# Patient Record
Sex: MALE | Race: BLACK OR AFRICAN AMERICAN
[De-identification: names, ages, dates, MRNs, and addresses within clinical notes are randomized per-mention and may not be internally consistent; named-entity substitution may affect disease eponyms.]

---

## 2018-07-08 ENCOUNTER — HOSPITAL ENCOUNTER (EMERGENCY)
Dept: HOSPITAL 97 - ER | Age: 23
Discharge: HOME | End: 2018-07-08
Payer: SELF-PAY

## 2018-07-08 DIAGNOSIS — F17.210: ICD-10-CM
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
## 2018-07-08 DIAGNOSIS — Z88.0: ICD-10-CM
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
## 2018-07-08 DIAGNOSIS — S39.012A: Primary | ICD-10-CM

## 2018-07-08 PROCEDURE — 99282 EMERGENCY DEPT VISIT SF MDM: CPT

## 2018-07-08 PROCEDURE — 81003 URINALYSIS AUTO W/O SCOPE: CPT

## 2018-07-08 NOTE — EDPHYS
Physician Documentation                                                                           

 [facility]                                                                

Name: Mark Wolff                                                                              

Age: 22 yrs                                                                                       

Sex: Male                                                                                         

: 1995                                                                                   

MRN: [id number]                                                                                   

Arrival Date: 2018                                                                          

Time: 19:30                                                                                       

Account#: [id number]                                                                            

Bed 18                                                                                            

Private MD:                                                                                       

ED Physician Fernandez López                                                                      

HPI:                                                                                              

                                                                                             

19:49 This 22 yrs old Black Male presents to ER via Ambulatory with complaints of Back Pain.  jmm 

19:49 The patient presents with pain that is acute. The symptoms are located in the lumbar    jmm 

      spine. Onset: The symptoms/episode began/occurred gradually, 1 week(s) ago. The pain        

      does not radiate. Associated signs and symptoms: Pertinent negatives: abdominal pain,       

      dysuria, fever, hematuria, incontinence, numbness, tingling, urinary retention,             

      vomiting. This is a 22 year old male with no chronic medical conditions that presents       

      to the ED with right lower back pain beginning approx 1 week ago. Patient denies fever,     

      abdominal pain, vomiting, shortness of breath, numbness, radiation of pain. The patient     

      states he is a  but denies a point at which he may have injured himself. .        

                                                                                                  

Historical:                                                                                       

- Allergies:                                                                                      

19:45 PENICILLINS;                                                                            fc  

- Home Meds:                                                                                      

19:45 None [Active];                                                                          fc  

- PMHx:                                                                                           

19:45 None;                                                                                   fc  

- PSHx:                                                                                           

19:45 None;                                                                                   fc  

                                                                                                  

- Immunization history:: Last tetanus immunization: up to date.                                   

- Social history:: Smoking status: Patient uses tobacco products, smokes one-half pack            

  cigarettes per day, Patient/guardian denies using alcohol, street drugs.                        

- Ebola Screening: : Patient negative for fever greater than or equal to 101.5 degrees            

  Fahrenheit, and additional compatible Ebola Virus Disease symptoms Patient denies               

  exposure to infectious person Patient denies travel to an Ebola-affected area in the            

  21 days before illness onset.                                                                   

                                                                                                  

                                                                                                  

ROS:                                                                                              

19:49 Constitutional: Negative for fever, chills, and weight loss, Cardiovascular: Negative   [facility] 

      for chest pain, palpitations, and edema, Respiratory: Negative for shortness of breath,     

      cough, wheezing, and pleuritic chest pain, Abdomen/GI: Negative for abdominal pain,         

      nausea, vomiting, diarrhea, and constipation.                                               

19:49 MS/Extremity: Negative for injury and deformity, Skin: Negative for injury, rash, and       

      discoloration, Neuro: Negative for headache, weakness, numbness, tingling, and seizure.     

19:49 Back: Positive for pain with movement.                                                      

19:49 All other systems are negative.                                                             

                                                                                                  

Exam:                                                                                             

19:49 Constitutional:  This is a well developed, well nourished patient who is awake, alert,  jmm 

      and in no acute distress. Head/Face:  atraumatic. Eyes:  EOMI, no conjunctival erythema     

      appreciated ENT:  Moist Mucus Membranes Neck:  Trachea midline, Supple Chest/axilla:        

      Normal chest wall appearance and motion.   Cardiovascular:  Regular rate and rhythm.        

      No edema appreciated Respiratory:  Normal respirations, no respiratory distress             

      appreciated Abdomen/GI:  Non distended, soft                                                

19:49 MS/ Extremity:  Moves all extremities, no obvious deformities appreciated, no edema         

      noted to the lower extremities  Neuro:  Awake and alert, normal gait                        

19:49 Back: pain, that is mild, of the  right low back, ROM is normal, painful, CVA               

      tenderness, is absent, muscle spasm, is not present.                                        

19:49 Neuro: Orientation: is normal, Mentation: is normal, Memory: is normal, extension of        

      the great toes noted bilaterally.                                                           

19:49 Psych: Behavior/mood is pleasant, cooperative.                                              

                                                                                                  

Vital Signs:                                                                                      

19:45  / 75; Pulse 65; Resp 18; Pulse Ox 100% on R/A; Weight 69.85 kg (R); Height 5 ft. fc  

      10 in. (177.80 cm) (R); Pain 7/10;                                                          

20:56  / 68; Pulse 80; Resp 17 S; Temp 98.5(O); Pulse Ox 100% on R/A;                   jd3 

19:45 Body Mass Index 22.10 (69.85 kg, 177.80 cm)                                             fc  

                                                                                                  

MDM:                                                                                              

19:48 Patient medically screened.                                                             [facility] 

20:16 Data reviewed: vital signs, nurses notes, lab test result(s). Counseling: I had a       [facility] 

      detailed discussion with the patient and/or guardian regarding: the historical points,      

      exam findings, and any diagnostic results supporting the discharge/admit diagnosis, the     

      need for outpatient follow up, to return to the emergency department if symptoms worsen     

      or persist or if there are any questions or concerns that arise at home. ED course:         

      Urine dip negative for blood. Symptoms appear consistent with back strain. patient will     

      be put on a course of muscle relaxers and anti inflammatories. Patient advised to           

      return to the ED if he develops abdominal pain, vomiting, fever, or worsening pain. .       

20:23 Counseling: I had a detailed discussion with the patient and/or guardian regarding: the [facility] 

      presence of at least one elevated blood pressure reading (>120/80) during this              

      emergency department visit.                                                                 

                                                                                                  

                                                                                             

20:11 Order name: Urine Dipstick--Ancillary (enter results)                                   rg2 

                                                                                             

20:11 Order name: Urine Dipstick-Ancillary; Complete Time: 20:23                              [facility]

                                                                                             

19:48 Order name: Urine Dipstick-Ancillary (obtain specimen); Complete Time: 20:10            [facility] 

                                                                                                  

Administered Medications:                                                                         

No medications were administered                                                                  

                                                                                                  

                                                                                                  

Disposition:                                                                                      

                                                                                             

09:29 Co-signature as Attending Physician, Fernandez López MD I agree with the assessment and  pamela 

      plan of care.                                                                               

                                                                                                  

Disposition:                                                                                      

18 20:18 Discharged to Home. Impression: Strain of muscle, fascia and tendon of lower       

  back.                                                                                           

- Condition is Stable.                                                                            

- Discharge Instructions: Back Pain, Adult.                                                       

- Prescriptions for Ibuprofen 800 mg Oral Tablet - take 1 tablet by ORAL route every 12           

  hours As needed take with food; 20 tablet. orphenadrine citrate 100 mg Oral Tablet              

  Sustained Release - take 1 tablet by ORAL route 2 times per day As needed; 20 tablet.           

- Work release form, Medication Reconciliation Form, Thank You Letter, Antibiotic                 

  Education, Prescription Opioid Use form.                                                        

- Follow up: Private Physician; When: 2 - 3 days; Reason: Continuance of care.                    

                                                                                                  

                                                                                                  

                                                                                                  

Signatures:                                                                                       

Dispatcher [employer]                           Fernandez England MD MD cha Mickail, Joel, PA PA jmm Chretien, Felicia, RN RN fc Davies, Jonathon, RN                    RN   jd3                                                  

                                                                                                  

Corrections: (The following items were deleted from the chart)                                    

                                                                                             

21:00 20:18 2018 20:18 Discharged to Home. Impression: Strain of muscle, fascia and     jd3 

      tendon of lower back. Condition is Stable. Forms are Medication Reconciliation Form,        

      Thank You Letter, Antibiotic Education, Prescription Opioid Use. Follow up: Private         

      Physician; When: 2 - 3 days; Reason: Continuance of care. [email or web address]                               

                                                                                                  

**************************************************************************************************

## 2018-07-08 NOTE — ER
Nurse's Notes                                                                                     

 [facility]                                                                

Name: Mark Wolff                                                                              

Age: 22 yrs                                                                                       

Sex: Male                                                                                         

: 1995                                                                                   

MRN: [id number]                                                                                   

Arrival Date: 2018                                                                          

Time: 19:30                                                                                       

Account#: [id number]                                                                            

Bed 18                                                                                            

Private MD:                                                                                       

Diagnosis: Strain of muscle, fascia and tendon of lower back                                      

                                                                                                  

Presentation:                                                                                     

                                                                                             

19:44 Presenting complaint: Patient states: that he is having lower back back that started 1  fc  

      week ago. Denies any injury, urinary problems or bowel problems. Transition of care:        

      patient was not received from another setting of care. Onset of symptoms was 2018. Risk Assessment: Do you want to hurt yourself or someone else? Patient reports no     

      desire to harm self or others. Initial Sepsis Screen: Does the patient meet any 2           

      criteria? No. Patient's initial sepsis screen is negative. Does the patient have a          

      suspected source of infection? No. Patient's initial sepsis screen is negative. Care        

      prior to arrival: None.                                                                     

19:44 Method Of Arrival: Ambulatory                                                             

19:44 Acuity: JAE 4                                                                             

                                                                                                  

Triage Assessment:                                                                                

19:46 General: Appears comfortable, slender, Behavior is calm, cooperative, appropriate for     

      age. Pain: Complains of pain in low back area Pain currently is 7 out of 10 on a pain       

      scale. Quality of pain is described as aching, Pain began 1 week ago Is continuous.         

      EENT: No deficits noted. Neuro: Level of Consciousness is awake, alert, obeys commands,     

      Oriented to person, place, time, situation. Cardiovascular: No deficits noted.              

      Respiratory: No deficits noted. GI: No deficits noted. : No deficits noted. Derm:         

      Skin is pink, warm \T\ dry. Musculoskeletal: Circulation, motion, and sensation intact.     

      Capillary refill < 3 seconds, Range of motion: intact in all extremities, Reports pain      

      in low back area.                                                                           

                                                                                                  

Historical:                                                                                       

- Allergies:                                                                                      

19:45 PENICILLINS;                                                                            fc  

- Home Meds:                                                                                      

19:45 None [Active];                                                                          fc  

- PMHx:                                                                                           

19:45 None;                                                                                   fc  

- PSHx:                                                                                           

19:45 None;                                                                                   fc  

                                                                                                  

- Immunization history:: Last tetanus immunization: up to date.                                   

- Social history:: Smoking status: Patient uses tobacco products, smokes one-half pack            

  cigarettes per day, Patient/guardian denies using alcohol, street drugs.                        

- Ebola Screening: : Patient negative for fever greater than or equal to 101.5 degrees            

  Fahrenheit, and additional compatible Ebola Virus Disease symptoms Patient denies               

  exposure to infectious person Patient denies travel to an Ebola-affected area in the            

  21 days before illness onset.                                                                   

                                                                                                  

                                                                                                  

Screenin:46 Abuse screen: Denies threats or abuse. Nutritional screening: No deficits noted.        fc  

      Tuberculosis screening: No symptoms or risk factors identified. Fall Risk None              

      identified.                                                                                 

                                                                                                  

Assessment:                                                                                       

19:50 General: Appears in no apparent distress. uncomfortable, Behavior is calm, cooperative, jd3 

      appropriate for age. Pain: Complains of pain in back Pain currently is 7 out of 10 on a     

      pain scale. Quality of pain is described as aching, Is continuous. Neuro: Level of          

      Consciousness is awake, alert, obeys commands, Oriented to person, place, time,             

      situation. Cardiovascular: Heart tones S1 S2 present Capillary refill < 3 seconds           

      Patient's skin is warm and dry. Respiratory: Airway is patent Respiratory effort is         

      even, unlabored, Respiratory pattern is regular, symmetrical, Breath sounds are clear       

      bilaterally. GI: Abdomen is flat, Bowel sounds present X 4 quads. Abd is soft and non       

      tender X 4 quads. Patient currently denies diarrhea, nausea, vomiting. : No signs         

      and/or symptoms were reported regarding the genitourinary system. Denies burning with       

      urination, inability to void, incontinence. EENT: No signs and/or symptoms were             

      reported regarding the EENT system. Derm: Skin is intact, Skin is dry, Skin is normal,      

      Skin temperature is warm. Musculoskeletal: Circulation, motion, and sensation intact.       

      Range of motion: intact in all extremities.                                                 

20:59 Reassessment: Patient appears in no apparent distress at this time. Patient and/or      jd3 

      family updated on plan of care and expected duration. Pain level reassessed. Patient is     

      alert, oriented x 3, equal unlabored respirations, skin warm/dry/pink. pt reported          

      understanding of discharge instructions, even and steady gait upon discharge.               

                                                                                                  

Vital Signs:                                                                                      

19:45  / 75; Pulse 65; Resp 18; Pulse Ox 100% on R/A; Weight 69.85 kg (R); Height 5 ft. fc  

      10 in. (177.80 cm) (R); Pain 7/10;                                                          

20:56  / 68; Pulse 80; Resp 17 S; Temp 98.5(O); Pulse Ox 100% on R/A;                   jd3 

19:45 Body Mass Index 22.10 (69.85 kg, 177.80 cm)                                               

                                                                                                  

ED Course:                                                                                        

19:30 Patient arrived in ED.                                                                  am2 

19:45 Triage completed.                                                                         

19:45 Arm band placed on Patient placed in an exam room, on a stretcher.                        

19:46 Omero Cordero, RN is Primary Nurse.                                                  jd3 

19:46 Patient has correct armband on for positive identification. Bed in low position. Call     

      light in reach. Side rails up X 1.                                                          

19:48 Chin Packer PA is PHCP.                                                              [facility] [place] 

19:48 Fernandez López MD is Attending Physician.                                             [facility] [place] 

20:57 No provider procedures requiring assistance completed. Patient did not have IV access   jd3 

      during this emergency room visit.                                                           

                                                                                                  

Administered Medications:                                                                         

No medications were administered                                                                  

                                                                                                  

                                                                                                  

Outcome:                                                                                          

20:18 Discharge ordered by MD.                                                                [facility] [place] 

20:58 Discharged to home ambulatory.                                                          jd3 

20:58 Condition: stable                                                                           

20:58 Discharge instructions given to patient, Instructed on discharge instructions, follow       

      up and referral plans. medication usage, Demonstrated understanding of instructions,        

      follow-up care, medications, Prescriptions given X 2.                                       

21:00 Patient left the ED.                                                                    jd3 

                                                                                                  

Signatures:                                                                                       

Chin Packer PA PA jmm Chretien, Felicia, RN                   RN                                                      

Nancy Juarez                               am2                                                  

Omero Cordero, RN                    RN   jd3                                                  

                                                                                                  

**************************************************************************************************

## 2019-07-31 ENCOUNTER — HOSPITAL ENCOUNTER (EMERGENCY)
Dept: HOSPITAL 97 - ER | Age: 24
Discharge: HOME | End: 2019-07-31
Payer: SELF-PAY

## 2019-07-31 DIAGNOSIS — Z72.0: ICD-10-CM

## 2019-07-31 DIAGNOSIS — Z88.0: ICD-10-CM

## 2019-07-31 DIAGNOSIS — L02.415: Primary | ICD-10-CM

## 2019-07-31 PROCEDURE — 0J9L0ZZ DRAINAGE OF RIGHT UPPER LEG SUBCUTANEOUS TISSUE AND FASCIA, OPEN APPROACH: ICD-10-PCS

## 2019-07-31 PROCEDURE — 87070 CULTURE OTHR SPECIMN AEROBIC: CPT

## 2019-07-31 PROCEDURE — 87186 SC STD MICRODIL/AGAR DIL: CPT

## 2019-07-31 PROCEDURE — 99284 EMERGENCY DEPT VISIT MOD MDM: CPT

## 2019-07-31 PROCEDURE — 87077 CULTURE AEROBIC IDENTIFY: CPT

## 2019-07-31 PROCEDURE — 87205 SMEAR GRAM STAIN: CPT

## 2019-07-31 NOTE — ER
Nurse's Notes                                                                                     

 [facility]                                                                 

Name: Mark Wolff                                                                              

Age: 23 yrs                                                                                       

Sex: Male                                                                                         

: 1995                                                                                   

MRN: [id number]                                                                                   

Arrival Date: 2019                                                                          

Time: 21:09                                                                                       

Account#: [id number]                                                                            

Bed 16                                                                                            

Private MD:                                                                                       

Diagnosis: Cutaneous abscess of right lower limb                                                  

                                                                                                  

Presentation:                                                                                     

                                                                                             

21:13 Presenting complaint: Patient states: "I got a boil on my leg" Reports that he noticed  aj1 

      it 3 or 4 days ago. Denies drainage. Denies fever. Transition of care: patient was not      

      received from another setting of care. Onset of symptoms was 2019. Risk                

      Assessment: Do you want to hurt yourself or someone else? Patient reports no desire to      

      harm self or others. Initial Sepsis Screen: Does the patient meet any 2 criteria? No.       

      Patient's initial sepsis screen is negative. Does the patient have a suspected source       

      of infection? Yes: Skin breakdown/wound. Care prior to arrival: None.                       

21:13 Method Of Arrival: Ambulatory                                                           [facility] 

21:13 Acuity: JAE 4                                                                           aj1 

                                                                                                  

Triage Assessment:                                                                                

21:14 General: Appears in no apparent distress. comfortable, Behavior is calm, cooperative,   aj1 

      appropriate for age. Pain: Complains of pain in lateral aspect of right thigh Pain          

      currently is 8 out of 10 on a pain scale. Neuro: Level of Consciousness is awake,           

      alert, obeys commands. Cardiovascular: Patient's skin is warm and dry. Respiratory:         

      Airway is patent Respiratory effort is even, unlabored, Respiratory pattern is regular,     

      symmetrical.                                                                                

                                                                                                  

Historical:                                                                                       

- Allergies:                                                                                      

21:14 PENICILLINS;                                                                            aj1 

- Home Meds:                                                                                      

21:14 None [Active];                                                                          aj1 

- PMHx:                                                                                           

21:14 None;                                                                                   aj1 

- PSHx:                                                                                           

21:14 None;                                                                                   aj1 

                                                                                                  

- Immunization history:: Flu vaccine is not up to date.                                           

- Social history:: Smoking status: Patient uses tobacco products, denies chronic                  

  smoking, but will smoke occasionally.                                                           

- Ebola Screening: : Patient denies travel to an Ebola-affected area in the 21 days               

  before illness onset.                                                                           

                                                                                                  

                                                                                                  

Screenin:25 Abuse screen: Denies threats or abuse. Nutritional screening: No deficits noted.        jb4 

      Tuberculosis screening: No symptoms or risk factors identified. Fall Risk None              

      identified.                                                                                 

                                                                                                  

Assessment:                                                                                       

21:25 General: Appears in no apparent distress. uncomfortable, Behavior is calm, cooperative, jb4 

      appropriate for age. Pain: Complains of pain in right hip Pain does not radiate. Pain       

      currently is 8 out of 10 on a pain scale. Quality of pain is described as pressure.         

      Neuro: Level of Consciousness is awake, alert, obeys commands, Oriented to person,          

      place, time, situation. Cardiovascular: Patient's skin is warm and dry. Respiratory:        

      Airway is patent Respiratory effort is even, unlabored, Respiratory pattern is regular,     

      symmetrical. GI: No signs and/or symptoms were reported involving the gastrointestinal      

      system. : No signs and/or symptoms were reported regarding the genitourinary system.      

      EENT: No signs and/or symptoms were reported regarding the EENT system. Derm: Skin is       

      intact, Skin is dry, Skin is normal, Skin temperature is warm Abscess located on right      

      hip is quarter sized, has no drainage, is red, is raised. Musculoskeletal: Circulation,     

      motion, and sensation intact. Range of motion: intact in all extremities.                   

21:25 Reassessment: Patient appears in no apparent distress at this time. Patient and/or      jb4 

      family updated on plan of care and expected duration. Pain level reassessed. Patient is     

      alert, oriented x 3, equal unlabored respirations, skin warm/dry/pink. I\T\D performed by   

      ER provider.                                                                                

22:10 Reassessment: Patient appears in no apparent distress at this time. Patient and/or      jb4 

      family updated on plan of care and expected duration. Pain level reassessed. Patient is     

      alert, oriented x 3, equal unlabored respirations, skin warm/dry/pink. PT left Ed           

      ambulatory with significant other, verbalized understanding of d/c and follow up            

      instructions, denies questions or concerns.                                                 

                                                                                                  

Vital Signs:                                                                                      

21:14  / 63; Pulse 83; Resp 18; Temp 99.5(TE); Pulse Ox 99% on R/A; Weight 78.47 kg     aj1 

      (R); Height 5 ft. 10 in. (177.80 cm) (R); Pain 8/10;                                        

22:00  / 79; Pulse 59; Resp 16; Pulse Ox 100% on R/A;                                   jb4 

21:14 Body Mass Index 24.82 (78.47 kg, 177.80 cm)                                             aj1 

                                                                                                  

ED Course:                                                                                        

21:09 Patient arrived in ED.                                                                  ag3 

21:14 Triage completed.                                                                       aj1 

21:14 Arm band placed on Patient placed in waiting room, Patient notified of wait time.       aj1 

21:20 Nancy Kevin FNP-C is [facility].                                                        kb  

21:20 Fernandez López MD is Attending Physician.                                             kb  

21:25 Patient has correct armband on for positive identification. Bed in low position. Call   jb4 

      light in reach. Side rails up X 1. Pulse ox on. NIBP on.                                    

21:45 Nathan Chambers, RN is Primary Nurse.                                                     jb4 

22:00 Assist provider with I \T\ D: of an abscess on right hip Set up I\T\D tray. Performed by    jacquelyn
4

      Nancy ABAD Culture sent to lab. Wound packed. iodoform gauze, Patient            

      tolerated well.                                                                             

22:10 Patient did not have IV access during this emergency room visit.                        jb4 

                                                                                                  

Administered Medications:                                                                         

21:52 Drug: Bactrim (160 mg-800 mg (DS) 1 tablet Route: PO;                                   jb4 

22:30 Follow up: Response: No adverse reaction                                                jb4 

21:59 Drug: Lidocaine (1 %) 1 vials {Note: administered by ER provider..} Volume: 5 ml;       jb4 

      Route: Infiltration;                                                                        

22:32 Follow up: Response: No adverse reaction                                                jb4 

                                                                                                  

                                                                                                  

Outcome:                                                                                          

22:06 Discharge ordered by MD.                                                                kb  

22:10 Discharged to home ambulatory, with significant other.                                  jb4 

22:10 Condition: stable                                                                           

22:10 Discharge instructions given to patient, significant other, Instructed on discharge         

      instructions, follow up and referral plans. medication usage, Demonstrated                  

      understanding of instructions, follow-up care, medications, Prescriptions given X 1.        

22:32 Patient left the ED.                                                                    jb4 

                                                                                                  

Addendum:                                                                                         

2019                                                                                        

     07:51 Addendum: Culture Results: Positive wound culture. No further action required. Bacteria i
w

           sensitive to prescribed antibiotic.                                                    

                                                                                                  

Signatures:                                                                                       

Nancy Kevin FNP-C                 FNGIAN-CkDorinda Joshi RN                     RN   aj1                                                  

Frances Day RN RN   iw                                                   

Nathan Chambers RN                       RN   jb4                                                  

Guera Canchola                                 ag3                                                  

                                                                                                  

Corrections: (The following items were deleted from the chart)                                    

                                                                                             

22:29 22:15 Initial lab(s) drawn, by me, sent to lab. [facility]                                     [facility] 

22:30 22:15  / 84; Pulse 98bpm; Resp 16bpm; Pulse Ox 98% RA; jb4                        jb4 

22:30 22:26 Reassessment: Patient appears in no apparent distress at this time. Patient       jb4 

      and/or family updated on plan of care and expected duration. Pain level reassessed.         

      Patient is alert, oriented x 3, equal unlabored respirations, skin warm/dry/pink. jb4       

                                                                                                  

**************************************************************************************************

## 2019-07-31 NOTE — EDPHYS
Physician Documentation                                                                           

 [facility]                                                                 

Name: Mark Wolff                                                                              

Age: 23 yrs                                                                                       

Sex: Male                                                                                         

: 1995                                                                                   

MRN: [id number]                                                                                   

Arrival Date: 2019                                                                          

Time: 21:09                                                                                       

Account#: [id number]                                                                            

Bed 16                                                                                            

Private MD:                                                                                       

ED Physician Fernandez López                                                                      

HPI:                                                                                              

                                                                                             

21:41 This 23 yrs old Black Male presents to ER via Ambulatory with complaints of Boil on Leg.kb  

21:41 The patient presents with an abscess of the lateral aspect of right thigh. Description: kb  

      erythematous, swollen, warm. Onset: The symptoms/episode began/occurred 4 day(s) ago.       

      Possible cause(s): unknown. Associated signs and symptoms: Pertinent positives:             

      erythema, swelling. Modifying factors: the symptoms are alleviated by nothing, the          

      symptoms are aggravated by pressure, squeezing the lesion and expressing the contents,      

      touching. Severity of symptoms: At their worst the symptoms were moderate, in the           

      emergency department the symptoms are unchanged. The patient has experienced similar        

      episodes in the past, a few times. The patient has not recently seen a physician.           

                                                                                                  

Historical:                                                                                       

- Allergies:                                                                                      

21:14 PENICILLINS;                                                                            aj1 

- Home Meds:                                                                                      

21:14 None [Active];                                                                          aj1 

- PMHx:                                                                                           

21:14 None;                                                                                   aj1 

- PSHx:                                                                                           

21:14 None;                                                                                   aj1 

                                                                                                  

- Immunization history:: Flu vaccine is not up to date.                                           

- Social history:: Smoking status: Patient uses tobacco products, denies chronic                  

  smoking, but will smoke occasionally.                                                           

- Ebola Screening: : Patient denies travel to an Ebola-affected area in the 21 days               

  before illness onset.                                                                           

                                                                                                  

                                                                                                  

ROS:                                                                                              

21:41 Constitutional: Negative for fever, chills, and weight loss, ENT: Negative for injury,  kb  

      pain, and discharge, Neck: Negative for injury, pain, and swelling, Cardiovascular:         

      Negative for chest pain, palpitations, and edema, Respiratory: Negative for shortness       

      of breath, cough, wheezing, and pleuritic chest pain, Abdomen/GI: Negative for              

      abdominal pain, nausea, vomiting, diarrhea, and constipation, MS/Extremity: Negative        

      for injury and deformity, Neuro: Negative for headache, weakness, numbness, tingling,       

      and seizure.                                                                                

21:41 Skin: Positive for abscess, of the lateral aspect of right thigh.                           

                                                                                                  

Exam:                                                                                             

21:41 Constitutional:  This is a well developed, well nourished patient who is awake, alert,  kb  

      and in no acute distress. Head/Face:  Normocephalic, atraumatic. ENT:  Nares patent. No     

      nasal discharge, no septal abnormalities noted.  Tympanic membranes are normal and          

      external auditory canals are clear.  Oropharynx with no redness, swelling, or masses,       

      exudates, or evidence of obstruction, uvula midline.  Mucous membranes moist. Neck:         

      Trachea midline, no thyromegaly or masses palpated, and no cervical lymphadenopathy.        

      Supple, full range of motion without nuchal rigidity, or vertebral point tenderness.        

      No Meningismus. Chest/axilla:  Normal chest wall appearance and motion.  Nontender with     

      no deformity.  No lesions are appreciated. Cardiovascular:  Regular rate and rhythm         

      with a normal S1 and S2.  No gallops, murmurs, or rubs.  Normal PMI, no JVD.  No pulse      

      deficits. Respiratory:  Lungs have equal breath sounds bilaterally, clear to                

      auscultation and percussion.  No rales, rhonchi or wheezes noted.  No increased work of     

      breathing, no retractions or nasal flaring. Abdomen/GI:  Soft, non-tender, with normal      

      bowel sounds.  No distension or tympany.  No guarding or rebound.  No evidence of           

      tenderness throughout. MS/ Extremity:  Pulses equal, no cyanosis.  Neurovascular            

      intact.  Full, normal range of motion. Neuro:  Awake and alert, GCS 15, oriented to         

      person, place, time, and situation.  Cranial nerves II-XII grossly intact.  Motor           

      strength 5/5 in all extremities.  Sensory grossly intact.  Cerebellar exam normal.          

      Normal gait.                                                                                

21:41 Skin: abscess, that is moderate sized, of the lateral aspect of right thigh, with           

      fluctuance, with surrounding cellulitis, that is mild.                                      

                                                                                                  

Vital Signs:                                                                                      

21:14  / 63; Pulse 83; Resp 18; Temp 99.5(TE); Pulse Ox 99% on R/A; Weight 78.47 kg     aj1 

      (R); Height 5 ft. 10 in. (177.80 cm) (R); Pain 8/10;                                        

22:00  / 79; Pulse 59; Resp 16; Pulse Ox 100% on R/A;                                   jb4 

21:14 Body Mass Index 24.82 (78.47 kg, 177.80 cm)                                             [facility] 

                                                                                                  

Procedures:                                                                                       

22:05 I \T\ D: Incision and drainage was performed for an abscess of the right lateral aspect   kb

      of right thigh Prepped with Betadine, Anesthetized with 3 ml's 1% Lidocaine. Incised        

      with #11 blade. Drained moderate amount purulent fluid. Packed with iodoform gauze,         

      Dressing: sterile 4x4 gauze, the patient tolerated the procedure well.                      

                                                                                                  

MDM:                                                                                              

21:27 Patient medically screened.                                                             [facility] 

21:40 Data reviewed: vital signs, nurses notes. Data interpreted: Pulse oximetry: on room air kb  

      is 99 %. Interpretation: normal. Counseling: I had a detailed discussion with the           

      patient and/or guardian regarding: the historical points, exam findings, and any            

      diagnostic results supporting the discharge/admit diagnosis, the need for outpatient        

      follow up, a family practitioner, to return to the emergency department if symptoms         

      worsen or persist or if there are any questions or concerns that arise at home.             

                                                                                                  

                                                                                             

22:07 Order name: Wound Culture                                                               kb  

                                                                                             

21:39 Order name: I\T\D Setup; Complete Time: 21:52                                             kb

                                                                                                  

Administered Medications:                                                                         

21:52 Drug: Bactrim (160 mg-800 mg (DS) 1 tablet Route: PO;                                   jb4 

22:30 Follow up: Response: No adverse reaction                                                jb4 

21:59 Drug: Lidocaine (1 %) 1 vials {Note: administered by ER provider..} Volume: 5 ml;       jb4 

      Route: Infiltration;                                                                        

22:32 Follow up: Response: No adverse reaction                                                jb4 

                                                                                                  

                                                                                                  

Disposition:                                                                                      

                                                                                             

07:20 Co-signature as Attending Physician, Frenandez López MD I agree with the assessment and  [facility] 

      plan of care.                                                                               

                                                                                                  

Disposition:                                                                                      

19 22:06 Discharged to Home. Impression: Cutaneous abscess of right lower limb.             

- Condition is Stable.                                                                            

- Discharge Instructions: Skin Abscess, Easy-to-Read, Incision and Drainage, Care After.          

- Prescriptions for Bactrim - 160 mg Oral Tablet - take 1 tablet by ORAL route              

  every 12 hours for 10 days; 20 tablet.                                                          

- Medication Reconciliation Form, Thank You Letter, Antibiotic Education, Prescription            

  Opioid Use form.                                                                                

- Follow up: Emergency Department; When: As needed; Reason: Worsening of condition.               

  Follow up: Private Physician; When: 2 - 3 days; Reason: Recheck today's complaints,             

  Continuance of care, Re-evaluation by your physician.                                           

                                                                                                  

                                                                                                  

                                                                                                  

Signatures:                                                                                       

Dispatcher [facility]                           Nancy Rodriguez FNP-C FNP-Yomaira                                                   

Dorinda Daugherty, RN                     RN   aj1                                                  

Fernandez López MD MD cha Bryson, James, RN                       RN   jb4                                                  

                                                                                                  

Corrections: (The following items were deleted from the chart)                                    

                                                                                             

22:32 22:06 2019 22:06 Discharged to Home. Impression: Cutaneous abscess of right lower jb4 

      limb. Condition is Stable. Forms are Medication Reconciliation Form, Thank You Letter,      

      Antibiotic Education, Prescription Opioid Use. Follow up: Emergency Department; When:       

      As needed; Reason: Worsening of condition. Follow up: Private Physician; When: 2 - 3        

      days; Reason: Recheck today's complaints, Continuance of care, Re-evaluation by your        

      physician. kb                                                                               

                                                                                                  

**************************************************************************************************

## 2019-12-17 ENCOUNTER — HOSPITAL ENCOUNTER (EMERGENCY)
Dept: HOSPITAL 97 - ER | Age: 24
Discharge: HOME | End: 2019-12-17
Payer: SELF-PAY

## 2019-12-17 VITALS — DIASTOLIC BLOOD PRESSURE: 76 MMHG | SYSTOLIC BLOOD PRESSURE: 115 MMHG

## 2019-12-17 VITALS — OXYGEN SATURATION: 100 % | TEMPERATURE: 98.6 F

## 2019-12-17 DIAGNOSIS — Z88.0: ICD-10-CM
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
## 2019-12-17 DIAGNOSIS — S06.0X1A: Primary | ICD-10-CM
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
## 2019-12-17 DIAGNOSIS — Z72.0: ICD-10-CM

## 2019-12-17 PROCEDURE — 70450 CT HEAD/BRAIN W/O DYE: CPT

## 2019-12-17 PROCEDURE — 99284 EMERGENCY DEPT VISIT MOD MDM: CPT

## 2019-12-17 NOTE — EDPHYS
Physician Documentation                                                                           

 [facility]                                                                 

Name: Mark Wolff                                                                              

Age: 24 yrs                                                                                       

Sex: Male                                                                                         

: 1995                                                                                   

MRN: [id number]                                                                                   

Arrival Date: 2019                                                                          

Time: 04:11                                                                                       

Account#: [id number]                                                                            

Bed 5                                                                                             

Private MD:                                                                                       

ED Physician Thony Mittal                                                                     

HPI:                                                                                              

                                                                                             

05:59 This 24 yrs old Black Male presents to ER via Ambulatory with complaints of Fall Injury.tw4 

05:59 Details of fall: The patient fell from an upright position, while walking. Onset: The   tw4 

      symptoms/episode began/occurred today. Associated injuries: The patient sustained           

      injury to the head. Severity of symptoms: At their worst the symptoms were mild, in the     

      emergency department the symptoms. The patient has not experienced similar symptoms in      

      the past.                                                                                   

                                                                                                  

Historical:                                                                                       

- Allergies:                                                                                      

04:20 PENICILLINS;                                                                            tl1 

- Home Meds:                                                                                      

04:20 None [Active];                                                                          tl1 

- PMHx:                                                                                           

04:20 None;                                                                                   tl1 

- PSHx:                                                                                           

04:20 None;                                                                                   tl1 

                                                                                                  

- Immunization history: Last tetanus immunization: unknown.                                       

- Social history:: Smoking status: Patient uses tobacco products, denies chronic                  

  smoking, but will smoke occasionally, Patient uses alcohol, occasionally.                       

  Patient/guardian denies using street drugs.                                                     

- Ebola Screening: : Patient negative for fever greater than or equal to 101.5 degrees            

  Fahrenheit, and additional compatible Ebola Virus Disease symptoms Patient denies               

  exposure to infectious person Patient denies travel to an Ebola-affected area in the            

  21 days before illness onset.                                                                   

                                                                                                  

                                                                                                  

ROS:                                                                                              

05:59 Constitutional: Negative for fever, chills, and weight loss, Eyes: Negative for injury, tw4 

      pain, redness, and discharge, Cardiovascular: Negative for chest pain, palpitations,        

      and edema, Respiratory: Negative for shortness of breath, cough, wheezing, and              

      pleuritic chest pain, Abdomen/GI: Negative for abdominal pain, nausea, vomiting,            

      diarrhea, and constipation, Back: Negative for injury and pain, MS/Extremity: Negative      

      for injury and deformity, Skin: Negative for injury, rash, and discoloration.               

                                                                                                  

Exam:                                                                                             

05:59 Constitutional:  This is a well developed, well nourished patient who is awake, alert,  tw4 

      and in no acute distress. Chest/axilla:  Normal chest wall appearance and motion.           

      Nontender with no deformity.  No lesions are appreciated.                                   

05:59 Cardiovascular:  Regular rate and rhythm with a normal S1 and S2.  No gallops, murmurs,     

      or rubs.  Normal PMI, no JVD.  No pulse deficits. Respiratory:  Lungs have equal breath     

      sounds bilaterally, clear to auscultation and percussion.  No rales, rhonchi or wheezes     

      noted.  No increased work of breathing, no retractions or nasal flaring. Abdomen/GI:        

      Soft, non-tender, with normal bowel sounds.  No distension or tympany.  No guarding or      

      rebound.  No evidence of tenderness throughout. Back:  No spinal tenderness.  No            

      costovertebral tenderness.  Full range of motion. MS/ Extremity:  Pulses equal, no          

      cyanosis.  Neurovascular intact.  Full, normal range of motion. Neuro:  Awake and           

      alert, GCS 15, oriented to person, place, time, and situation.  Cranial nerves II-XII       

      grossly intact.  Motor strength 5/5 in all extremities.  Sensory grossly intact.            

      Cerebellar exam normal.  Normal gait.                                                       

05:59 Head/face: Noted is deformity, of the  left cheek.                                          

                                                                                                  

Vital Signs:                                                                                      

04:20  / 61; Pulse 113; Resp 17; Temp 98.6; Pulse Ox 100% on R/A; Weight 78.47 kg;      tl1 

      Height 5 ft. 9 in. (175.26 cm); Pain 0/10;                                                  

05:40  / 77; Pulse 80; Resp 17; Pulse Ox 100% on R/A;                                   tl1 

06:07  / 76; Pulse 79; Resp 18; Pulse Ox 100% ;                                         ea  

04:20 Body Mass Index 25.55 (78.47 kg, 175.26 cm)                                             tl1 

                                                                                                  

Stratford Coma Score:                                                                               

04:41 Eye Response: spontaneous(4). Verbal Response: oriented(5). Motor Response: obeys       tl1 

      commands(6). Total: 15.                                                                     

                                                                                                  

Trauma Score (Adult):                                                                             

04:41 Eye Response: spontaneous(1); Verbal Response: oriented(1); Motor Response: obeys       tl1 

      commands(2); Systolic BP: > 89 mm Hg(4); Respiratory Rate: 10 to 29 per min(4); Stratford     

      Score: 15; Trauma Score: 12                                                                 

                                                                                                  

MDM:                                                                                              

04:20 Patient medically screened.                                                             tw4 

05:59 Differential diagnosis: abrasion, contusion, fracture. Data reviewed: vital signs,      tw4 

      nurses notes. Data interpreted: Pulse oximetry: Interpretation: normal. Counseling: I       

      had a detailed discussion with the patient and/or guardian regarding: the historical        

      points, exam findings, and any diagnostic results supporting the discharge/admit            

      diagnosis. Special discussion: I discussed with the patient/guardian in detail that at      

      this point there is no indication for admission to the hospital. It is understood,          

      however, that if the symptoms persist or worsen the patient needs to return immediately     

      for re-evaluation. Special discussion: Based on the patient's history, exam and DX          

      evaluation, there is no indication for emergent intervention or inpatient TX. It is         

      understood by the patient/guardian that if the SXs persist or worsen they need to           

      return immediately for re-evaluation.                                                       

                                                                                                  

                                                                                             

04:45 Order name: CT Head Brain wo Cont                                                       tw4 

                                                                                                  

Administered Medications:                                                                         

05:40 Drug: Ibuprofen 600 mg Route: PO;                                                       tl1 

06:07 Follow up: Response: No adverse reaction                                                ea  

                                                                                                  

                                                                                                  

Disposition:                                                                                      

19 06:04 Discharged to Home. Impression: Concussion with loss of consciousness of 30        

  minutes or less, Contusion of other part of head.                                               

- Condition is Stable.                                                                            

- Discharge Instructions: Contusion, Head Injury, Adult, Easy-to-Read.                            

- Prescriptions for Ibuprofen 800 mg Oral Tablet - take 1 tablet by ORAL route every 8            

  hours As needed take with food; 30 tablet.                                                      

- Work release form, Family Work Release, Medication Reconciliation Form, Thank You               

  Letter, Antibiotic Education, Prescription Opioid Use form.                                     

- Follow up: Private Physician; When: Upon discharge from the Emergency Department;               

  Reason: Recheck today's complaints, Continuance of care.                                        

- Problem is new.                                                                                 

- Symptoms have improved.                                                                         

                                                                                                  

                                                                                                  

                                                                                                  

Signatures:                                                                                       

Dispatcher [employer]                           EDMS                                                 

Linsey Segundo RN                      RN   tl1                                                  

Juliana Pate RN                      Thony Brar ea, MD MD   tw4                                                  

                                                                                                  

Corrections: (The following items were deleted from the chart)                                    

06:12 06:04 2019 06:04 Discharged to Home. Impression: Concussion with loss of          ea  

      consciousness of 30 minutes or less; Contusion of other part of head. Condition is          

      Stable. Forms are Medication Reconciliation Form, Thank You Letter, Antibiotic              

      Education, Prescription Opioid Use. Follow up: Private Physician; When: Upon discharge      

      from the Emergency Department; Reason: Recheck today's complaints, Continuance of care.     

      Problem is new. Symptoms have improved. tw4                                                 

                                                                                                  

**************************************************************************************************

## 2019-12-17 NOTE — RAD REPORT
EXAM DESCRIPTION:  CT - Head Brain Wo Cont - 12/17/2019 5:43 am

 

CLINICAL HISTORY:  The patient is 24 years old and is Male; PAIN

 

TECHNIQUE:  Axial computed tomography images of the head/brain without intravenous contrast.   Sagitt
al and coronal reformatted images were created and reviewed.   This CT exam was performed using one o
r more of the following dose reduction techniques:   automated exposure control, adjustment of the mA
 and/or kV according to patient size, and/or use of iterative reconstruction technique.

 

COMPARISON:  No relevant prior studies available.

 

FINDINGS:  BRAIN:   Unremarkable.   The gray-white matter differentiation is preserved . No hemorrhag
e.   No significant white matter disease.   No edema. No extra-axial fluid collections.

   VENTRICLES:   Unremarkable.   No ventriculomegaly.

   BONES/JOINTS:   No acute fracture.

   SOFT TISSUES:   Unremarkable.

   SINUSES:   Unremarkable as visualized.   No acute sinusitis.

   MASTOID AIR CELLS:   Unremarkable as visualized.   No mastoid effusion.

   ORBITS:   Unremarkable as visualized.

 

IMPRESSION:  No acute intracranial findings.

 

Electronically signed by:   Keke Milligan MD   12/17/2019 5:37 AM CST Workstation: [id number]

 

 

 

Due to temporary technical issues with the PACS/Fluency reporting system, reports are being signed by
 the in house radiologist as a courtesy to ensure prompt reporting. The interpreting radiologist is f
ully responsible for the content of the report.

## 2019-12-17 NOTE — ER
Nurse's Notes                                                                                     

 [facility]                                                                 

Name: Mark Wolff                                                                              

Age: 24 yrs                                                                                       

Sex: Male                                                                                         

: 1995                                                                                   

MRN: [id number]                                                                                   

Arrival Date: 2019                                                                          

Time: 04:11                                                                                       

Account#: [id number]                                                                            

Bed 5                                                                                             

Private MD:                                                                                       

Diagnosis: Concussion with loss of consciousness of 30 minutes or less;Contusion of other part of 

  head                                                                                            

                                                                                                  

Presentation:                                                                                     

                                                                                             

04:18 Presenting complaint: Patient states: I fell down some stairs and my friends say I      tl1 

      might have a concussion. I hit the side of my head and face. Care prior to arrival:         

      None. Mechanism of Injury: Fall down steps. Trauma event details: Injury occurred in        

      the [facility].                                                                     

04:18 Acuity: JAE 4                                                                           tl1 

04:18 Method Of Arrival: Ambulatory                                                           tl1 

04:20 Transition of care: patient was not received from another setting of care. Onset of     ea  

      symptoms was 2019. Risk Assessment: Do you want to hurt yourself or            

      someone else? Patient reports no desire to harm self or others. Initial Sepsis Screen:      

      Does the patient meet any 2 criteria? No. Patient's initial sepsis screen is negative.      

      Does the patient have a suspected source of infection? No. Patient's initial sepsis         

      screen is negative.                                                                         

                                                                                                  

Trauma Activation: Not Applicable                                                                 

 Physician: ED Physician; Name: ; Notified At: ; Arrived At:                                      

 Physician: General Surgeon; Name: ; Notified At: ; Arrived At:                                   

 Physician: Radiology; Name: ; Notified At: ; Arrived At:                                         

 Physician: Respiratory; Name: ; Notified At: ; Arrived At:                                       

 Physician: Lab; Name: ; Notified At: ; Arrived At:                                               

                                                                                                  

Historical:                                                                                       

- Allergies:                                                                                      

04:20 PENICILLINS;                                                                            tl1 

- Home Meds:                                                                                      

04:20 None [Active];                                                                          tl1 

- PMHx:                                                                                           

04:20 None;                                                                                   tl1 

- PSHx:                                                                                           

04:20 None;                                                                                   tl1 

                                                                                                  

- Immunization history: Last tetanus immunization: unknown.                                       

- Social history:: Smoking status: Patient uses tobacco products, denies chronic                  

  smoking, but will smoke occasionally, Patient uses alcohol, occasionally.                       

  Patient/guardian denies using street drugs.                                                     

- Ebola Screening: : Patient negative for fever greater than or equal to 101.5 degrees            

  Fahrenheit, and additional compatible Ebola Virus Disease symptoms Patient denies               

  exposure to infectious person Patient denies travel to an Ebola-affected area in the            

  21 days before illness onset.                                                                   

                                                                                                  

                                                                                                  

Screenin:20 Nutritional screening: No deficits noted. Fall Risk Fall in past 12 months (25 points). ea  

04:23 Abuse screen: Denies threats or abuse. Denies injuries from another. Tuberculosis       tl1 

      screening: No symptoms or risk factors identified.                                          

                                                                                                  

Primary Survey:                                                                                   

04:21 NO uncontrolled hemorrhage observed. A: The patient is alert. Airway: patent.           tl1 

      Breathing/Chest: Respiratory pattern: regular, Respiratory effort: spontaneous,             

      unlabored, Breath sounds: clear, bilaterally. Chest inspection: symmetrical rise and        

      fall of the chest. Circulation: Skin color: pink, Skin temperature: warm, dry.              

      Disability Alert. Exposure/Environment: There is no evidence of uncontrolled external       

      bleeding. Obvious injury(ies) are noted at this time: closed head injury. Reassessment      

      Airway Airway Patent Breathing/Chest Respiratory pattern Regular Respiratory effort         

      Spontaneous Unlabored Circulation Pulses Palpable Color Pink Temperature Warm Dry           

      Disability Alert.                                                                           

                                                                                                  

Secondary Survey:                                                                                 

04:22 HEENT: Head. Gastrointestinal: No deficits noted. : No deficits noted.                tl1 

      Musculoskeletal: No deficits noted. Injury Description: Head injury sustained to left       

      cheek and left temple is closed, did not have loss of consciousness, was sustained          

      30-60 minutes ago.                                                                          

                                                                                                  

Assessment:                                                                                       

04:24 General: Appears in no apparent distress. Behavior is calm, cooperative, appropriate    tl1 

      for age. Pain: Denies pain. Neuro: Level of Consciousness is awake, alert, obeys            

      commands, Oriented to person, place, time, situation, Gait is steady, Speech is normal.     

      EENT: No signs and/or symptoms were reported regarding the EENT system. Cardiovascular:     

      Denies chest pain. Respiratory: Airway is patent Trachea midline Respiratory effort is      

      even, unlabored, Breath sounds are clear bilaterally. GI: No signs and/or symptoms were     

      reported involving the gastrointestinal system. : No signs and/or symptoms were           

      reported regarding the genitourinary system. Derm: No signs and/or symptoms reported        

      regarding the dermatologic system. Musculoskeletal: No signs and/or symptoms reported       

      regarding the musculoskeletal system. Injury Description: Head injury sustained to left     

      temple and left cheek is closed, did not have loss of consciousness, was sustained          

      30-60 minutes ago.                                                                          

05:05 Reassessment: Friend of patient called and stated pt fell down and hit the left side of tl1 

      his face on the concrete and passed out for approx 45 seconds. Pt states he doesn't         

      recall passing out. pt is alert and oriented during assessment.                             

06:08 Reassessment: Patient and/or family updated on plan of care and expected duration. Pain ea  

      level reassessed. Patient is alert, oriented x 3, equal unlabored respirations, skin        

      warm/dry/pink. Discharge instruction given to patient and significant other, both           

      verbalized the understanding of instruction. Pt left ED ambulatory, tolerating well.        

                                                                                                  

Vital Signs:                                                                                      

04:20  / 61; Pulse 113; Resp 17; Temp 98.6; Pulse Ox 100% on R/A; Weight 78.47 kg;      tl1 

      Height 5 ft. 9 in. (175.26 cm); Pain 0/10;                                                  

05:40  / 77; Pulse 80; Resp 17; Pulse Ox 100% on R/A;                                   tl1 

06:07  / 76; Pulse 79; Resp 18; Pulse Ox 100% ;                                         ea  

04:20 Body Mass Index 25.55 (78.47 kg, 175.26 cm)                                             tl1 

                                                                                                  

Goyo Coma Score:                                                                               

04:41 Eye Response: spontaneous(4). Verbal Response: oriented(5). Motor Response: obeys       tl1 

      commands(6). Total: 15.                                                                     

                                                                                                  

Trauma Score (Adult):                                                                             

04:41 Eye Response: spontaneous(1); Verbal Response: oriented(1); Motor Response: obeys       tl1 

      commands(2); Systolic BP: > 89 mm Hg(4); Respiratory Rate: 10 to 29 per min(4); [place]     

      Score: 15; Trauma Score: 12                                                                 

                                                                                                  

ED Course:                                                                                        

04:11 Patient arrived in ED.                                                                  [id number] 

04:13 Thony Mittal MD is Attending Physician.                                            tw4 

04:18 Linsey Segundo RN is Primary Nurse.                                                    tl1 

04:19 Triage completed.                                                                       tl1 

04:20 Thermoregulation: warm blanket given to patient.                                        ea  

04:21 Arm band placed on right wrist.                                                         tl1 

04:24 Patient has correct armband on for positive identification. Bed in low position. Call   ea  

      light in reach.                                                                             

04:24 No provider procedures requiring assistance completed. Patient maintains SpO2           tl1 

      saturation greater than 95% on room air.                                                    

06:10 Patient did not have IV access during this emergency room visit.                        ea  

                                                                                                  

Administered Medications:                                                                         

05:40 Drug: Ibuprofen 600 mg Route: PO;                                                       tl1 

06:07 Follow up: Response: No adverse reaction                                                ea  

                                                                                                  

                                                                                                  

Intake:                                                                                           

06:10 PO: 0ml; Total: 0ml.                                                                    ea  

                                                                                                  

Outcome:                                                                                          

06:04 Discharge ordered by MD.                                                                tw4 

06:10 Discharged to home ambulatory, with family.                                             ea  

06:10 Condition: stable                                                                           

06:10 Patient's length of stay was not longer than 2 hours.                                       

06:10 Discharge instructions given to patient, Instructed on discharge instructions, follow   ea  

      up and referral plans. Demonstrated understanding of instructions, follow-up care,          

      medications, Prescriptions given X 1.                                                       

06:12 Patient left the ED.                                                                    ea  

                                                                                                  

Signatures:                                                                                       

Linsey Segundo, RN                      RN   tl1                                                  

Juliana Pate RN                      RN   Thony Madsen MD MD   tw4                                                  

Alexa Eagle7                                                  

                                                                                                  

**************************************************************************************************

## 2021-07-29 ENCOUNTER — HOSPITAL ENCOUNTER (EMERGENCY)
Dept: HOSPITAL 97 - ER | Age: 26
Discharge: HOME | End: 2021-07-29
Payer: COMMERCIAL

## 2021-07-29 VITALS — OXYGEN SATURATION: 99 % | TEMPERATURE: 97.8 F | SYSTOLIC BLOOD PRESSURE: 127 MMHG | DIASTOLIC BLOOD PRESSURE: 63 MMHG

## 2021-07-29 DIAGNOSIS — Z91.018: ICD-10-CM

## 2021-07-29 DIAGNOSIS — Z88.0: ICD-10-CM

## 2021-07-29 DIAGNOSIS — L29.9: Primary | ICD-10-CM

## 2021-07-29 PROCEDURE — 99283 EMERGENCY DEPT VISIT LOW MDM: CPT

## 2021-07-29 NOTE — ER
Nurse's Notes                                                                                     

 [facility]                                                                 

Name: Mark Wolff                                                                              

Age: 25 yrs                                                                                       

Sex: Male                                                                                         

: 1995                                                                                   

MRN: [id number]                                                                                   

Arrival Date: 2021                                                                          

Time: 08:46                                                                                       

Account#: [id number]                                                                            

Bed 12                                                                                            

Private MD:                                                                                       

Diagnosis: Allergy to other foods                                                                 

                                                                                                  

Presentation:                                                                                     

                                                                                             

08:49 Chief complaint: Patient states: "I was just at work eating breakfast and my feet       aa5 

      started itching and then my hands and now my throat is itchy as well". No signs of          

      respiratory distress noted. Pt states "I only drank a kirby drink this morning and my       

      mom is allergic to mangos". Coronavirus screen: At this time, the client does not           

      indicate any symptoms associated with coronavirus-19. Ebola Screen: Patient negative        

      for fever greater than or equal to 101.5 degrees Fahrenheit, and additional compatible      

      Ebola Virus Disease symptoms. Onset: The symptoms/episode began/occurred 30 minute(s)       

      ago. Anaphylaxis evaluation, no signs or symptoms of anaphylaxis were noted. Initial        

      Sepsis Screen: Does the patient meet any 2 criteria? No. Patient's initial sepsis           

      screen is negative. Does the patient have a suspected source of infection? No.              

      Patient's initial sepsis screen is negative. Risk Assessment: Do you want to hurt           

      yourself or someone else? Patient reports no desire to harm self or others. Onset of        

      symptoms was 2021.                                                                 

08:49 Method Of Arrival: Ambulatory                                                           aa5 

08:49 Acuity: JAE 4                                                                           aa5 

                                                                                                  

Triage Assessment:                                                                                

08:46 General: Appears uncomfortable, Behavior is calm, cooperative. Pain: Denies pain. EENT: aa5 

      Reports itching to throat . Neuro: Level of Consciousness is awake, alert, obeys            

      commands, Oriented to person, place, time, situation. Cardiovascular: Heart tones S1 S2     

      present Rhythm is regular. Respiratory: Airway is patent Respiratory effort is even,        

      unlabored, Respiratory pattern is regular, symmetrical. GI: No signs and/or symptoms        

      were reported involving the gastrointestinal system. : No signs and/or symptoms were      

      reported regarding the genitourinary system. Derm: Skin is dry, Skin is normal, Skin        

      temperature is warm Reports itching to hands and feet. Musculoskeletal: Range of            

      motion: intact in all extremities.                                                          

                                                                                                  

Historical:                                                                                       

- Allergies:                                                                                      

08:51 PENICILLINS;                                                                            aa5 

- Home Meds:                                                                                      

08:51 None [Active];                                                                          aa5 

- PMHx:                                                                                           

08:51 None;                                                                                   aa5 

- PSHx:                                                                                           

08:51 None;                                                                                   aa5 

                                                                                                  

- Immunization history:: Adult Immunizations up to date.                                          

- Social history:: Smoking status: Patient denies any tobacco usage or history of.                

                                                                                                  

                                                                                                  

Screening:                                                                                        

10:33 Abuse screen: Denies threats or abuse. Denies injuries from another. Nutritional        iw  

      screening: No deficits noted. Tuberculosis screening: No symptoms or risk factors           

      identified. Fall Risk None identified.                                                      

                                                                                                  

Assessment:                                                                                       

09:34 Reassessment: Patient states feeling better. Patient states symptoms have improved.     aa5 

      General: Appears comfortable. Neuro: Level of Consciousness is awake, alert, obeys          

      commands, Oriented to person, place, time, situation. Derm: Skin is dry, Skin is            

      normal, Skin temperature is warm.                                                           

10:33 Respiratory: Reports Airway is patent Breath sounds are clear.                          iw  

                                                                                                  

Vital Signs:                                                                                      

08:49  / 79; Pulse 56; Resp 16 S; Temp 98.3(TE); Pulse Ox 98% on R/A; Weight 81.65 kg   aa5 

      (R); Height 5 ft. 10 in. (177.80 cm) (R);                                                   

09:34  / 63; Pulse 54; Resp 18 S; Temp 97.8(TE); Pulse Ox 99% on R/A;                   aa5 

08:49 Body Mass Index 25.83 (81.65 kg, 177.80 cm)                                             aa5 

                                                                                                  

ED Course:                                                                                        

08:46 Patient arrived in ED.                                                                  as  

08:49 Arm band placed on.                                                                     aa5 

08:51 Triage completed.                                                                       aa5 

08:56 Fernandez Hardin PA is PHCP.                                                                cp  

08:56 Fernandez López MD is Attending Physician.                                             cp  

09:34 Patient has correct armband on for positive identification.                             iw  

09:36 Steff Vela, RN is Primary Nurse.                                                   aa5 

10:33 No provider procedures requiring assistance completed. Patient did not have IV access   iw  

      during this emergency room visit.                                                           

                                                                                                  

Administered Medications:                                                                         

09:01 Drug: Benadryl (diphenhydrAMINE) 50 mg Route: PO;                                       aa5 

10:33 Follow up: Response: No adverse reaction                                                aa5 

09:01 Drug: predniSONE 60 mg Route: PO;                                                       aa5 

10:33 Follow up: Response: No adverse reaction                                                aa5 

09:01 Drug: Pepcid (famotidine) 20 mg Route: PO;                                              aa5 

10:33 Follow up: Response: No adverse reaction                                                aa5 

                                                                                                  

                                                                                                  

Outcome:                                                                                          

10:22 Discharge ordered by MD.                                                                cp  

10:33 Discharged to home ambulatory.                                                          iw  

10:33 Condition: good                                                                             

10:33 Discharge instructions given to patient, Instructed on discharge instructions, follow       

      up and referral plans. Demonstrated understanding of instructions, follow-up care,          

      medications, Prescriptions given X 2.                                                       

10:34 Patient left the ED.                                                                    iw  

                                                                                                  

Signatures:                                                                                       

Bella Bryant Irene, RN RN   iw                                                   

Steff Vela RN                     RN   aa5                                                  

Fernandez Hardin PA                         PA   cp                                                   

                                                                                                  

Corrections: (The following items were deleted from the chart)                                    

09:35 09:34 Pulse 54bpm; Resp 18bpm; Spontaneous; Pulse Ox 99% RA; Temp 97.8F Temporal; aa5   aa5 

                                                                                                  

**************************************************************************************************